# Patient Record
Sex: MALE | Race: OTHER | HISPANIC OR LATINO | Employment: FULL TIME | URBAN - METROPOLITAN AREA
[De-identification: names, ages, dates, MRNs, and addresses within clinical notes are randomized per-mention and may not be internally consistent; named-entity substitution may affect disease eponyms.]

---

## 2022-03-01 ENCOUNTER — HOSPITAL ENCOUNTER (EMERGENCY)
Facility: HOSPITAL | Age: 37
Discharge: HOME | End: 2022-03-02
Attending: EMERGENCY MEDICINE
Payer: MEDICAID

## 2022-03-01 DIAGNOSIS — Z23 TETANUS TOXOID VACCINATION ADMINISTERED AT CURRENT VISIT: ICD-10-CM

## 2022-03-01 DIAGNOSIS — S09.90XA INJURY OF HEAD, INITIAL ENCOUNTER: Primary | ICD-10-CM

## 2022-03-01 DIAGNOSIS — S01.01XA LACERATION OF SCALP, INITIAL ENCOUNTER: ICD-10-CM

## 2022-03-01 PROCEDURE — 90471 IMMUNIZATION ADMIN: CPT | Performed by: NURSE PRACTITIONER

## 2022-03-01 PROCEDURE — 12001 RPR S/N/AX/GEN/TRNK 2.5CM/<: CPT

## 2022-03-01 PROCEDURE — 99284 EMERGENCY DEPT VISIT MOD MDM: CPT | Mod: 25

## 2022-03-01 PROCEDURE — 63600175 PHARM REV CODE 636 W HCPCS: Performed by: NURSE PRACTITIONER

## 2022-03-01 PROCEDURE — 90715 TDAP VACCINE 7 YRS/> IM: CPT | Performed by: NURSE PRACTITIONER

## 2022-03-01 RX ADMIN — TETANUS TOXOID, REDUCED DIPHTHERIA TOXOID AND ACELLULAR PERTUSSIS VACCINE, ADSORBED 0.5 ML: 5; 2.5; 8; 8; 2.5 SUSPENSION INTRAMUSCULAR at 06:03
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
## 2022-03-02 VITALS
HEIGHT: 70 IN | WEIGHT: 240 LBS | BODY MASS INDEX: 34.36 KG/M2 | OXYGEN SATURATION: 97 % | RESPIRATION RATE: 16 BRPM | TEMPERATURE: 98 F | SYSTOLIC BLOOD PRESSURE: 138 MMHG | HEART RATE: 85 BPM | DIASTOLIC BLOOD PRESSURE: 96 MMHG

## 2022-03-02 NOTE — DISCHARGE INSTRUCTIONS
Please keep your wound clean and dry.  Wash gently with soap and water and apply antibiotic ointment (bacitracin, neosporin, etc.) over the wound after washing. Please watch for signs of infection including: increased\spreading redness, swelling, pus-like discharge, or a fever greater than 100.4F. If you experience any of these, please contact your Primary Care Doctor or Return to the Emergency Department for a wound check.     Please follow up with your Primary Care Doctor in 3-5 days for wound recheck and staple removal.  You may return to the Emergency Department if you are unable to see your Primary Care Doctor.  Please return to the ER for any new or worsening symptoms.    Tylenol or ibuprofen as needed for headaches.  If headaches persist after 1-2 weeks, please follow up with the concussion Clinic.

## 2022-03-02 NOTE — ED PROVIDER NOTES
ED Physician Hand-off Note:    ED Course: I assumed care of patient from off-going ED physician team. Briefly, Patient presented for head trauma with laceration.    At the time of signout plan was pending CT head. Laceration repaired in ED by offgoing provider.    Medications given in the ED:    Medications   Tdap (BOOSTRIX) vaccine injection 0.5 mL (0.5 mLs Intramuscular Given 3/1/22 1804)       Impression: CTH concerned for small focal hyperdensity of right anterior horn. Discussed with NSGY, recommends q6H repeat CTH to assess for stability. Patient agreed to plan of care and voiced understanding.    Signed out at shift change pending serial exam at 0200.     Linwood Matta PA-C  03/02/2022         Amisha Ly PA-C  03/01/22 6648      Linwood Matta dictating (3/2/22, 2570): repeat head CT without any evidence of acute intracranial abnormality. Denies HA on reevaluation. AAOx4. Wound repaired without issues by previous provider. He feels comfortable with d/c and outpatient f/u. Concussion precautions discussed. Return precautions given.      Linwood Matta PA-C  03/02/22 5381
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
## 2023-03-09 ENCOUNTER — TELEPHONE (OUTPATIENT)
Dept: UROLOGY | Facility: CLINIC | Age: 38
End: 2023-03-09
Payer: MEDICAID

## 2023-03-09 NOTE — TELEPHONE ENCOUNTER
The pt wife called wanting to get her  scheduled for a vasectomy. I explained to his wife that we would not be able to take his insurance as a new pt unless he had an [facility] PCP.  Provided her with the number to call and get established with a PCP. I told her to call back once he has established care with them and we can get him set up for a consult. She verbalized understanding.